# Patient Record
Sex: FEMALE | URBAN - METROPOLITAN AREA
[De-identification: names, ages, dates, MRNs, and addresses within clinical notes are randomized per-mention and may not be internally consistent; named-entity substitution may affect disease eponyms.]

---

## 2024-10-31 ENCOUNTER — TRANSCRIBE ORDERS (OUTPATIENT)
Dept: ADMINISTRATIVE | Facility: HOSPITAL | Age: 64
End: 2024-10-31

## 2024-10-31 DIAGNOSIS — Z12.31 OTHER SCREENING MAMMOGRAM: Primary | ICD-10-CM

## 2025-01-07 ENCOUNTER — HOSPITAL ENCOUNTER (OUTPATIENT)
Dept: MAMMOGRAPHY | Facility: HOSPITAL | Age: 65
Discharge: HOME | End: 2025-01-07
Payer: COMMERCIAL

## 2025-01-07 DIAGNOSIS — Z12.31 OTHER SCREENING MAMMOGRAM: ICD-10-CM

## 2025-01-07 PROCEDURE — 77063 BREAST TOMOSYNTHESIS BI: CPT

## 2025-01-07 PROCEDURE — 77067 SCR MAMMO BI INCL CAD: CPT

## 2025-01-31 ENCOUNTER — TRANSCRIBE ORDERS (OUTPATIENT)
Dept: ADMINISTRATIVE | Facility: HOSPITAL | Age: 65
End: 2025-01-31
Payer: COMMERCIAL

## 2025-01-31 DIAGNOSIS — R92.8 ABNORMAL MAMMOGRAM: Primary | ICD-10-CM

## 2025-02-25 ENCOUNTER — HOSPITAL ENCOUNTER (OUTPATIENT)
Dept: ULTRASOUND IMAGING | Facility: HOSPITAL | Age: 65
Discharge: HOME | End: 2025-02-25
Payer: COMMERCIAL

## 2025-02-25 ENCOUNTER — HOSPITAL ENCOUNTER (OUTPATIENT)
Dept: MAMMOGRAPHY | Facility: HOSPITAL | Age: 65
Discharge: HOME | End: 2025-02-25
Payer: COMMERCIAL

## 2025-02-25 DIAGNOSIS — R92.8 ABNORMAL MAMMOGRAM: ICD-10-CM

## 2025-02-25 PROCEDURE — 76642 ULTRASOUND BREAST LIMITED: CPT

## 2025-02-25 PROCEDURE — G0279 TOMOSYNTHESIS, MAMMO: HCPCS

## 2025-02-25 PROCEDURE — 77065 DX MAMMO INCL CAD UNI: CPT

## 2025-06-13 NOTE — PROGRESS NOTES
GYN new patient    CC: abnormal pap smear    Tobacco/Nicotine use:  No    HPI:   64 y.o. Contraception or HRT: s/p HYST, still has one or both ovaries, benign indications was done secondary to cervical dysplasia, s/p cryosurgery, LEEP and persistent dysplasia    History of Present Illness  The patient presents for evaluation of an abnormal Pap smear, rash, and anxiety.    She underwent a colposcopy following an abnormal Pap smear. She was informed that due to her previous hysterectomy, the lab conducted a different test than initially ordered, which has caused her some confusion. During a routine checkup with LAVERN Camejo, she was reassured that her results were normal. She has a history of abnormal Pap smears, which led to her hysterectomy. Prior to the hysterectomy, various procedures were attempted including LEEP, D and C, and cryotherapy, but none yielded satisfactory results. She retains one ovary, the other having been removed due to a cyst. Since her hysterectomy, she has been undergoing annual Pap smears, with the most recent one being the first to show abnormal results. She was  for 41 years and became sexually active 2 years after her 's death in , during which time she has had multiple partners. She reports no current issues with vaginal dryness or painful intercourse, although she has experienced these symptoms in the past. She also mentions that she undergoes regular screenings at the health department due to her sexual activity.    She has a prescription for Xanax, which she takes as needed. She contacted Comfort Burris's office for a refill but was informed that she would need to come in person due to it being a controlled substance. The initial prescription was issued in [place]. She is requesting a refill from our office.    She has been experiencing a rash that initially appeared as two spots, which she thought were mosquito bites. The rash then spread to her  arm, leaving a scar on her hand, and later appeared on her ankle. She has tried hydrocortisone cream without success. The rash also appeared on her neck before disappearing.    GYNECOLOGICAL HISTORY:  Gynecology History discussed    SEXUAL HISTORY:  Sexual history discussed  - Became sexually active 2 years after 's death in 2021  - Multiple partners since becoming sexually active    PAST SURGICAL HISTORY:  Hysterectomy  Removal of one ovary due to a cyst        History: PMHx, Meds, Allergies, PSHx, Social Hx, and POBHx all reviewed and updated.  PCP:Comfort Burris, DNP, APRN      Review of Systems     /75   Pulse 68   Wt 55.8 kg (123 lb)   Breastfeeding No     Physical Exam  Vitals and nursing note reviewed.   Constitutional:       Appearance: Normal appearance.   Cardiovascular:      Rate and Rhythm: Normal rate and regular rhythm.      Heart sounds: Normal heart sounds.   Pulmonary:      Effort: Pulmonary effort is normal.      Breath sounds: Normal breath sounds.   Abdominal:      General: Abdomen is flat. Bowel sounds are normal.      Palpations: Abdomen is soft.   Skin:         Neurological:      Mental Status: She is alert.       PAP Test Thin Prep (02/12/2025 01:00)    ASSESSMENT AND PLAN:  Problem Visit    Diagnoses and all orders for this visit:    1. Atypical squamous cells of undetermined significance on cytologic smear of cervix (ASC-US) (Primary)    2. Dermatitis  -     clobetasol (TEMOVATE) 0.05 % ointment; Apply 1 Application topically to the appropriate area as directed 2 (Two) Times a Day.  Dispense: 60 g; Refill: 3        Assessment & Plan  1. Abnormal Pap smear.  Pap smear results from 02/2025 were normal. Given the previous abnormal result, a repeat Pap smear is recommended in 02/2026 to ensure no abnormalities are present. Regular Pap smears of the vagina should be continued to monitor for any potential issues.  Previous Pap smear obtained December 2024 was ASCUS with high  risk HPV detected.  The notes from the colposcopy performed in February 2025 indicate some acetowhite changes noted at the vaginal cuff.  There are no biopsy descriptions or results.  A Pap smear was obtained in February 2025 which was negative for [place].  At this time I recommend repeating Pap smear in 1 year    - Educated on the importance of regular Pap smears to monitor for any abnormalities.    2. Rash.  The rash could potentially be hives, necessitating a different type of management. A prescription for clobetasol ointment has been provided, with instructions to apply it twice daily until the rash resolves. If the rash does not improve within a week, further consultation with Comfort Burris DNP, APRN will be necessary.    - Educated on the use of clobetasol ointment, including application instructions and potential side effects.  - Advised to monitor the rash and seek further consultation if there is no improvement within a week.    3. Anxiety.  Currently taking Xanax as needed for anxiety but has difficulty obtaining refills due to it being a controlled substance. Advised to consult with Comfort Burris DNP, APRN for a refill and to discuss the possibility of increasing Prozac dosage or exploring other mental health support options.    - Discussed the risks and benefits of Xanax use, including potential for dependency and controlled substance regulations.  - Encouraged to explore additional mental health support options, including potential adjustments to Prozac dosage.    4. Breast cancer screening.  No sign of breast cancer. A follow-up mammogram and ultrasound are scheduled for 08/2025 to monitor the asymmetry in the left breast.    - Educated on the importance of follow-up breast cancer screenings to monitor for any changes.  - Discussed the benign nature of the asymmetry and the rationale for continued monitoring.    Follow-up  Follow-up appointment scheduled for 02/2026.               Follow  Up:  Return in about 8 months (around 2/16/2026) for Annual physical.        Danyelle Asencio MD  06/16/2025    Patient or patient representative verbalized consent for the use of Ambient Listening during the visit with  Danyelle Asencio MD for chart documentation. 6/16/2025  11:23 EDT

## 2025-06-16 ENCOUNTER — OFFICE VISIT (OUTPATIENT)
Dept: OBSTETRICS AND GYNECOLOGY | Age: 65
End: 2025-06-16
Payer: COMMERCIAL

## 2025-06-16 VITALS — DIASTOLIC BLOOD PRESSURE: 75 MMHG | WEIGHT: 123 LBS | SYSTOLIC BLOOD PRESSURE: 135 MMHG | HEART RATE: 68 BPM

## 2025-06-16 DIAGNOSIS — L30.9 DERMATITIS: ICD-10-CM

## 2025-06-16 DIAGNOSIS — R87.610 ATYPICAL SQUAMOUS CELLS OF UNDETERMINED SIGNIFICANCE ON CYTOLOGIC SMEAR OF CERVIX (ASC-US): Primary | ICD-10-CM

## 2025-06-16 RX ORDER — MONTELUKAST SODIUM 10 MG/1
10 TABLET ORAL DAILY
COMMUNITY
Start: 2025-03-19

## 2025-06-16 RX ORDER — PREDNISONE 20 MG/1
1 TABLET ORAL EVERY 12 HOURS SCHEDULED
COMMUNITY
Start: 2025-06-09 | End: 2025-06-16

## 2025-06-16 RX ORDER — ALPRAZOLAM 0.25 MG
TABLET ORAL
COMMUNITY

## 2025-06-16 RX ORDER — FLUTICASONE PROPIONATE AND SALMETEROL 100; 50 UG/1; UG/1
POWDER RESPIRATORY (INHALATION)
COMMUNITY
Start: 2025-04-16

## 2025-06-16 RX ORDER — CLOBETASOL PROPIONATE 0.5 MG/G
1 OINTMENT TOPICAL 2 TIMES DAILY
Qty: 60 G | Refills: 3 | Status: SHIPPED | OUTPATIENT
Start: 2025-06-16

## 2025-06-16 RX ORDER — ACYCLOVIR 50 MG/G
OINTMENT TOPICAL
COMMUNITY
Start: 2025-05-14

## 2025-06-16 RX ORDER — LISINOPRIL 10 MG/1
10 TABLET ORAL
COMMUNITY
Start: 2025-03-13

## 2025-06-16 RX ORDER — FLUOXETINE 10 MG/1
10 CAPSULE ORAL DAILY
COMMUNITY
Start: 2025-03-19

## 2025-06-16 RX ORDER — ALBUTEROL SULFATE 90 UG/1
INHALANT RESPIRATORY (INHALATION)
COMMUNITY
Start: 2025-06-13

## 2025-06-16 RX ORDER — IPRATROPIUM BROMIDE 21 UG/1
SPRAY, METERED NASAL
COMMUNITY
Start: 2025-03-23

## 2025-06-16 RX ORDER — PHENAZOPYRIDINE HYDROCHLORIDE 100 MG/1
100 TABLET, FILM COATED ORAL 3 TIMES DAILY PRN
COMMUNITY
Start: 2025-05-23

## 2025-07-29 ENCOUNTER — TRANSCRIBE ORDERS (OUTPATIENT)
Dept: ADMINISTRATIVE | Facility: HOSPITAL | Age: 65
End: 2025-07-29
Payer: COMMERCIAL

## 2025-07-29 DIAGNOSIS — R92.8 OTHER ABNORMAL AND INCONCLUSIVE FINDINGS ON DIAGNOSTIC IMAGING OF BREAST: Primary | ICD-10-CM

## 2025-07-31 ENCOUNTER — TELEPHONE (OUTPATIENT)
Dept: OBSTETRICS AND GYNECOLOGY | Age: 65
End: 2025-07-31
Payer: COMMERCIAL

## 2025-08-12 ENCOUNTER — HOSPITAL ENCOUNTER (OUTPATIENT)
Dept: MAMMOGRAPHY | Facility: HOSPITAL | Age: 65
Discharge: HOME | End: 2025-08-12
Payer: COMMERCIAL

## 2025-08-12 ENCOUNTER — HOSPITAL ENCOUNTER (OUTPATIENT)
Dept: ULTRASOUND IMAGING | Facility: HOSPITAL | Age: 65
Discharge: HOME | End: 2025-08-12
Payer: COMMERCIAL

## 2025-08-12 DIAGNOSIS — R92.8 OTHER ABNORMAL AND INCONCLUSIVE FINDINGS ON DIAGNOSTIC IMAGING OF BREAST: ICD-10-CM

## 2025-08-12 PROCEDURE — G0279 TOMOSYNTHESIS, MAMMO: HCPCS

## 2025-08-12 PROCEDURE — 77065 DX MAMMO INCL CAD UNI: CPT

## 2025-08-12 PROCEDURE — 76642 ULTRASOUND BREAST LIMITED: CPT
